# Patient Record
Sex: MALE | Race: WHITE | Employment: STUDENT | URBAN - METROPOLITAN AREA
[De-identification: names, ages, dates, MRNs, and addresses within clinical notes are randomized per-mention and may not be internally consistent; named-entity substitution may affect disease eponyms.]

---

## 2017-11-24 ENCOUNTER — RADIANT APPOINTMENT (OUTPATIENT)
Dept: GENERAL RADIOLOGY | Facility: OTHER | Age: 27
End: 2017-11-24
Attending: ORTHOPAEDIC SURGERY
Payer: COMMERCIAL

## 2017-11-24 ENCOUNTER — OFFICE VISIT (OUTPATIENT)
Dept: ORTHOPEDICS | Facility: OTHER | Age: 27
End: 2017-11-24
Payer: COMMERCIAL

## 2017-11-24 VITALS — WEIGHT: 240 LBS | TEMPERATURE: 99 F | BODY MASS INDEX: 29.84 KG/M2 | HEIGHT: 75 IN

## 2017-11-24 DIAGNOSIS — Z87.81 H/O FRACTURE OF FEMUR: Primary | ICD-10-CM

## 2017-11-24 DIAGNOSIS — M89.8X5 PAIN OF RIGHT FEMUR: ICD-10-CM

## 2017-11-24 PROCEDURE — 99204 OFFICE O/P NEW MOD 45 MIN: CPT | Performed by: ORTHOPAEDIC SURGERY

## 2017-11-24 PROCEDURE — 73552 X-RAY EXAM OF FEMUR 2/>: CPT | Mod: RT

## 2017-11-24 RX ORDER — HYDROXYZINE HYDROCHLORIDE 25 MG/1
25-50 TABLET, FILM COATED ORAL EVERY 6 HOURS PRN
Qty: 60 TABLET | Refills: 0 | Status: SHIPPED | OUTPATIENT
Start: 2017-11-24

## 2017-11-24 RX ORDER — OXYCODONE HYDROCHLORIDE 5 MG/1
5-10 TABLET ORAL EVERY 4 HOURS PRN
Qty: 60 TABLET | Refills: 0 | Status: SHIPPED | OUTPATIENT
Start: 2017-11-24
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
## 2017-11-24 NOTE — MR AVS SNAPSHOT
After Visit Summary   11/24/2017    Steven Kate    MRN: [id number]           Patient Information     Date Of Birth          1990        Visit Information        Provider Department      11/24/2017 1:50 PM Charleen Moralse MD [facility]        Today's Diagnoses     H/O fracture of femur with IM Lokesh Surgery    -  1      Care Instructions    Encounter Diagnosis   Name Primary?     H/O fracture of femur with IM Lokesh Surgery Yes     Rest, ice and elevate above heart level as needed for pain control  Plan:  1. Xrays: The lokesh looks good, the fracture is aligned.    2. Incision(s): Daily dressing changes until follow up.  We gave you some new dressings today.    -Abrasions: these will heal, just keep clean.  We do not feel it is infected, so that is good.  3. Anticoagulation: Continue Lovenox 40mg SQ Qday x 21 days after surgery.   4. Pain Medication: We gave vistaril today (unfortunately you did not get this right away). We would say continue tylenol and ibuprofen but stager it.  Take the vistaril with the oxycodone.  We did a refill of Oxycodone today also.  We wrote out on paper how to do this.  We did 60 of vistaril and oxycodone today.  5. Weight Bearing: Toe touch weight bearing for now.  This means enough weight just to balance.    6. Activity/Therapy: move toes ankle knee 2-3 x per day.  Ankle pumps also.  7. Brace/Sling: Not needed.  8. Work: Off work for now.    Follow-up:  With Dr. Xie in 2 weeks s/p surgery as it is scheduled already.    WebMD and [employer] may offer reliable information regarding your diagnosis and treatment plan.    THANK YOU for coming in today. If you receive a survey via [employer] or mail please let us know if there was anything you especially appreciated today or if there is any way we can improve our clinic. We appreciate your input.    GENERAL INFORMATION:  Our hours are:  Monday :     Clinic 7:30 AM-430 PM ([facility]  [place])  Tuesday:      Operating Room All Day ([facility])  Wednesday: Clinic 7:30 AM - 11:15 AM ([facility])                        Clinic 1:00 PM - 4:00PM ([facility])  Thursday:     Administrative Day  Friday:          Clinic 7:30 AM - 11:15 AM ([facility])                       Clinic 1:00 PM - 4:00 PM ([facility])    Bone and Joint Service Line for any issues or concerns: [phone]   We are not in the office Thursdays. Therefore non- urgent calls and medical messages received on Thursday will be addressed when we are back in the office on Wednesday. Urgent matters will be reviewed and addressed by one of our partners in the office as needed.    If lab work was done today as part of your evaluation you will generally be contacted via [employer], mail, or phone with the results within 1-5 days. If there is an alarming result we will contact you by phone. Lab results come back at varying times, I generally wait until all labs are resulted before making comments on results. Please note labs are automatically released to [employer] (if you have signed up for it) once available-at times you may see these prior to my having a chance to review them as well.    If you need refills please contact your pharmacist. They will send a refill request to me to review. Please allow 3 business days for us to process all refill requests. All narcotic refills should be handled in the clinic at the time of your visit.           Follow-ups after your visit        Who to contact     If you have questions or need follow up information about today's clinic visit or your schedule please contact [facility] directly at [phone].  Normal or non-critical lab and imaging results will be communicated to you by [employer], letter or phone within 4 business days after the clinic has received the results. If you do not hear  "from us within 7 days, please contact the clinic through [employer]) or phone. If you have a critical or abnormal lab result, we will notify you by phone as soon as possible.  Submit refill requests through [employer]) or call your pharmacy and they will forward the refill request to us. Please allow 3 business days for your refill to be completed.          Additional Information About Your Visit        [employer] Information     [employer]) lets you send messages to your doctor, view your test results, renew your prescriptions, schedule appointments and more. To sign up, go to [email or web address] ([employer]) . Click on \"Log in\" on the left side of the screen, which will take you to the Welcome page. Then click on \"Sign up Now\" on the right side of the page.     You will be asked to enter the access code listed below, as well as some personal information. Please follow the directions to create your username and password.     Your access code is: [email or web address]  Expires: 2018  3:22 PM     Your access code will  in 90 days. If you need help or a new code, please call your [facility] clinic or [phone].        Care EveryWhere ID     This is your Care EveryWhere ID. This could be used by other organizations to access your [facility] medical records  [id number]        Your Vitals Were     Temperature Height BMI (Body Mass Index)             99  F (37.2  C) 1.905 m (6' 3\") 30 kg/m2          Blood Pressure from Last 3 Encounters:   04 112/64   01 108/60    Weight from Last 3 Encounters:   17 108.9 kg (240 lb)   04 88.9 kg (196 lb) (97 %)*   01 60.4 kg (133 lb 4 oz) (91 %)*     * Growth percentiles are based on CDC 2-20 Years data.                 Today's Medication Changes          These changes are accurate as of: 17  3:22 PM.  If you have any questions, ask your nurse or doctor.               Start taking these medicines.        Dose/Directions    hydrOXYzine 25 MG tablet   Commonly known as:  " ATARAX   Used for:  H/O fracture of femur   Started by:  Charleen Morales MD        Dose:  25-50 mg   Take 1-2 tablets (25-50 mg) by mouth every 6 hours as needed for other (pain)   Quantity:  60 tablet   Refills:  0       oxyCODONE IR 5 MG tablet   Commonly known as:  ROXICODONE   Used for:  H/O fracture of femur   Started by:  Charleen Morales MD        Dose:  5-10 mg   Take 1-2 tablets (5-10 mg) by mouth every 4 hours as needed for pain Give 1 tab for pain 1-5, give 2 tabs for pain 5-10. Indication: Pain   Quantity:  60 tablet   Refills:  0            Where to get your medicines      These medications were sent to [facility] [address] [address]     Phone:  [phone]     hydrOXYzine 25 MG tablet         Some of these will need a paper prescription and others can be bought over the counter.  Ask your nurse if you have questions.     Bring a paper prescription for each of these medications     oxyCODONE IR 5 MG tablet                Primary Care Provider Office Phone # Fax #    Issa Glover PA-C [phone] [address] [facility] [address]        Equal Access to Services     BREN RUBIO AH: Casa brown hadasho Soomaali, waaxda luqadaha, qaybta kaalmada adeegyada, waxay padmini scott. So [facility] [phone].    ATENCIÓN: Si habla español, tiene a almonte disposición servicios gratuitos de asistencia lingüística. rodrigo al [phone].    We comply with applicable federal civil rights laws and [place] laws. We do not discriminate on the basis of race, color, national origin, age, disability, sex, sexual orientation, or gender identity.            Thank you!     Thank you for choosing [facility]  for your care. Our goal is always to provide you with excellent care. Hearing back from our patients is one way we can continue to improve our services. Please take a few minutes to  complete the written survey that you may receive in the mail after your visit with us. Thank you!             Your Updated Medication List - Protect others around you: Learn how to safely use, store and throw away your medicines at www.disposemymeds.org.          This list is accurate as of: 11/24/17  3:22 PM.  Always use your most recent med list.                   Brand Name Dispense Instructions for use Diagnosis    hydrOXYzine 25 MG tablet    ATARAX    60 tablet    Take 1-2 tablets (25-50 mg) by mouth every 6 hours as needed for other (pain)    H/O fracture of femur       oxyCODONE IR 5 MG tablet    ROXICODONE    60 tablet    Take 1-2 tablets (5-10 mg) by mouth every 4 hours as needed for pain Give 1 tab for pain 1-5, give 2 tabs for pain 5-10. Indication: Pain    H/O fracture of femur       VICODIN 5-500 MG per tablet   Generic drug:  HYDROcodone-acetaminophen      1-2 tabs every 4-6 hrs prn

## 2017-11-24 NOTE — NURSING NOTE
"Chief Complaint   Patient presents with     Consult     rt femur        Initial Temp 99  F (37.2  C)  Ht 1.905 m (6' 3\")  Wt 108.9 kg (240 lb)  BMI 30 kg/m2 Estimated body mass index is 30 kg/(m^2) as calculated from the following:    Height as of this encounter: 1.905 m (6' 3\").    Weight as of this encounter: 108.9 kg (240 lb).  Medication Reconciliation: complete    BP completed using cuff size: NA (Not Taken)    Carmela Reese MA      "

## 2017-11-24 NOTE — PATIENT INSTRUCTIONS
Encounter Diagnosis   Name Primary?     H/O fracture of femur with IM Lokesh Surgery Yes     Rest, ice and elevate above heart level as needed for pain control  Plan:  1. Xrays: The lokesh looks good, the fracture is aligned.    2. Incision(s): Daily dressing changes until follow up.  We gave you some new dressings today.    -Abrasions: these will heal, just keep clean.  We do not feel it is infected, so that is good.  3. Anticoagulation: Continue Lovenox 40mg SQ Qday x 21 days after surgery.   4. Pain Medication: We gave vistaril today (unfortunately you did not get this right away). We would say continue tylenol and ibuprofen but stager it.  Take the vistaril with the oxycodone.  We did a refill of Oxycodone today also.  We wrote out on paper how to do this.  We did 60 of vistaril and oxycodone today.  5. Weight Bearing: Toe touch weight bearing for now.  This means enough weight just to balance.    6. Activity/Therapy: move toes ankle knee 2-3 x per day.  Ankle pumps also.  7. Brace/Sling: Not needed.  8. Work: Off work for now.    Follow-up:  With Dr. Xie at [facility] in 2 weeks s/p surgery as it is scheduled already.    [employer] and [employer] may offer reliable information regarding your diagnosis and treatment plan.    THANK YOU for coming in today. If you receive a survey via [employer] or mail please let us know if there was anything you especially appreciated today or if there is any way we can improve our clinic. We appreciate your input.    GENERAL INFORMATION:  Our hours are:  Monday :     Clinic 7:30 AM-430 PM ([facility])  Tuesday:      Operating Room All Day ([facility])  Wednesday: Clinic 7:30 AM - 11:15 AM ([facility])                        Clinic 1:00 PM - 4:00PM ([facility])  Thursday:     Administrative Day  Friday:          Clinic 7:30 AM - 11:15 AM ([facility])                        Clinic 1:00 PM - 4:00 PM ([facility])    Bone and Joint Service Line for any issues or concerns: [phone]   We are not in the office Thursdays. Therefore non- urgent calls and medical messages received on Thursday will be addressed when we are back in the office on Wednesday. Urgent matters will be reviewed and addressed by one of our partners in the office as needed.    If lab work was done today as part of your evaluation you will generally be contacted via [employer], mail, or phone with the results within 1-5 days. If there is an alarming result we will contact you by phone. Lab results come back at varying times, I generally wait until all labs are resulted before making comments on results. Please note labs are automatically released to [employer] (if you have signed up for it) once available-at times you may see these prior to my having a chance to review them as well.    If you need refills please contact your pharmacist. They will send a refill request to me to review. Please allow 3 business days for us to process all refill requests. All narcotic refills should be handled in the clinic at the time of your visit.
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
## 2017-12-18 ENCOUNTER — TRANSFERRED RECORDS (OUTPATIENT)
Dept: HEALTH INFORMATION MANAGEMENT | Facility: CLINIC | Age: 27
End: 2017-12-18

## 2018-01-15 ENCOUNTER — TRANSFERRED RECORDS (OUTPATIENT)
Dept: HEALTH INFORMATION MANAGEMENT | Facility: CLINIC | Age: 28
End: 2018-01-15

## 2018-01-17 ENCOUNTER — TRANSFERRED RECORDS (OUTPATIENT)
Dept: HEALTH INFORMATION MANAGEMENT | Facility: CLINIC | Age: 28
End: 2018-01-17

## 2018-03-08 ENCOUNTER — TRANSFERRED RECORDS (OUTPATIENT)
Dept: HEALTH INFORMATION MANAGEMENT | Facility: CLINIC | Age: 28
End: 2018-03-08

## 2018-04-05 ENCOUNTER — TRANSFERRED RECORDS (OUTPATIENT)
Dept: HEALTH INFORMATION MANAGEMENT | Facility: CLINIC | Age: 28
End: 2018-04-05

## 2018-12-03 ENCOUNTER — TRANSFERRED RECORDS (OUTPATIENT)
Dept: HEALTH INFORMATION MANAGEMENT | Facility: CLINIC | Age: 28
End: 2018-12-03

## 2019-12-09 ENCOUNTER — TRANSFERRED RECORDS (OUTPATIENT)
Dept: HEALTH INFORMATION MANAGEMENT | Facility: CLINIC | Age: 29
End: 2019-12-09